# Patient Record
Sex: FEMALE | Race: ASIAN | NOT HISPANIC OR LATINO
[De-identification: names, ages, dates, MRNs, and addresses within clinical notes are randomized per-mention and may not be internally consistent; named-entity substitution may affect disease eponyms.]

---

## 2024-04-30 ENCOUNTER — APPOINTMENT (OUTPATIENT)
Dept: ORTHOPEDIC SURGERY | Facility: CLINIC | Age: 29
End: 2024-04-30
Payer: COMMERCIAL

## 2024-04-30 VITALS — BODY MASS INDEX: 21.03 KG/M2 | WEIGHT: 142 LBS | HEIGHT: 69 IN

## 2024-04-30 DIAGNOSIS — S33.5XXA SPRAIN OF LIGAMENTS OF LUMBAR SPINE, INITIAL ENCOUNTER: ICD-10-CM

## 2024-04-30 DIAGNOSIS — S63.501A UNSPECIFIED SPRAIN OF RIGHT WRIST, INITIAL ENCOUNTER: ICD-10-CM

## 2024-04-30 DIAGNOSIS — S23.9XXA SPRAIN OF UNSPECIFIED PARTS OF THORAX, INITIAL ENCOUNTER: ICD-10-CM

## 2024-04-30 DIAGNOSIS — Z86.39 PERSONAL HISTORY OF OTHER ENDOCRINE, NUTRITIONAL AND METABOLIC DISEASE: ICD-10-CM

## 2024-04-30 DIAGNOSIS — Z78.9 OTHER SPECIFIED HEALTH STATUS: ICD-10-CM

## 2024-04-30 PROBLEM — Z00.00 ENCOUNTER FOR PREVENTIVE HEALTH EXAMINATION: Status: ACTIVE | Noted: 2024-04-30

## 2024-04-30 PROCEDURE — 73110 X-RAY EXAM OF WRIST: CPT | Mod: RT

## 2024-04-30 PROCEDURE — 72080 X-RAY EXAM THORACOLMB 2/> VW: CPT

## 2024-04-30 PROCEDURE — 99204 OFFICE O/P NEW MOD 45 MIN: CPT

## 2024-04-30 NOTE — PHYSICAL EXAM
[Able to Communicate] : able to communicate [Well Developed] : well developed [Well Nourished] : well nourished [NL (90)] : forward flexion 90 degrees [NL (30)] : right lateral bending 30 degrees [Bending to right] : bending to right [Dorsal Wrist] : dorsal wrist [5___] : grasp 5[unfilled]/5 [de-identified] : thin athletic [No bony abnormalities] : No bony abnormalities [] : no tenderness over hand [Right] : right wrist [There are no fractures, subluxations or dislocations. No significant abnormalities are seen] : There are no fractures, subluxations or dislocations. No significant abnormalities are seen [de-identified] : miguel angel polk S-L area [FreeTextEntry9] : Slight pain with active volarflexion

## 2024-04-30 NOTE — HISTORY OF PRESENT ILLNESS
[Upper back] : upper back [Mid-back] : mid-back [Rest] : rest [Lying in bed] : lying in bed [Full time] : Work status: full time [de-identified] : 4/30/24:  Initial visit for this 29 year old female RHD works out regularly and noticed spontaneous onset of rt wrist pain x last 2 months duration. No hx of trauma. Tried resting x 1 month and got better but now has some discomfort as she returns to her exercising.     Also was c/o rt sided midback after doing lat pull downs x 2 months ago.  Also improved with rest over last 1 month but has noticed some pain returning. 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

## 2024-04-30 NOTE — PLAN
[TextEntry] : The patient was advised of the diagnosis. The natural history of the pathology was explained in full to the patient in layman's terms. All questions were answered. The risks and benefits of surgical and non-surgical treatment alternatives were explained in full to the patient.   Modify exercise routine.  Heat for her back, ice for her wrist.  Recommend over the counter medications on as needed basis.   If no improvement, consider MRI.

## 2024-04-30 NOTE — REASON FOR VISIT
[FreeTextEntry2] : 2 months achy right wrist pain with pain level 5 active and 2 rest/ burning mid/upper back pain with pain level 2 active and rest 5..